# Patient Record
Sex: MALE | Race: BLACK OR AFRICAN AMERICAN | Employment: OTHER | URBAN - METROPOLITAN AREA
[De-identification: names, ages, dates, MRNs, and addresses within clinical notes are randomized per-mention and may not be internally consistent; named-entity substitution may affect disease eponyms.]

---

## 2022-01-01 ENCOUNTER — HOSPITAL ENCOUNTER (EMERGENCY)
Age: 57
End: 2022-01-10
Attending: EMERGENCY MEDICINE
Payer: OTHER GOVERNMENT

## 2022-01-01 VITALS — OXYGEN SATURATION: 77 % | RESPIRATION RATE: 46 BRPM

## 2022-01-01 DIAGNOSIS — Z99.2 ESRD (END STAGE RENAL DISEASE) ON DIALYSIS (HCC): ICD-10-CM

## 2022-01-01 DIAGNOSIS — I46.9 PEA (PULSELESS ELECTRICAL ACTIVITY) (HCC): ICD-10-CM

## 2022-01-01 DIAGNOSIS — I46.9 CARDIAC ARREST (HCC): Primary | ICD-10-CM

## 2022-01-01 DIAGNOSIS — N18.6 ESRD (END STAGE RENAL DISEASE) ON DIALYSIS (HCC): ICD-10-CM

## 2022-01-01 PROCEDURE — 31500 INSERT EMERGENCY AIRWAY: CPT

## 2022-01-01 PROCEDURE — 92950 HEART/LUNG RESUSCITATION CPR: CPT

## 2022-01-01 PROCEDURE — 99283 EMERGENCY DEPT VISIT LOW MDM: CPT

## 2022-01-01 RX ORDER — NOREPINEPHRINE BIT/0.9 % NACL 16MG/250ML
INFUSION BOTTLE (ML) INTRAVENOUS
Status: DISCONTINUED
Start: 2022-01-01 | End: 2022-01-01 | Stop reason: HOSPADM

## 2022-01-10 NOTE — ED PROVIDER NOTES
Emergency Department Encounter    Patient: Rojas Armijo  MRN: [id number]  : 1965  Date of Evaluation: 1/10/2022  ED Provider:  Zeyad Jensen MD      Triage Chief Complaint:   Code      [place]:  Rojas Armijo is a 61 y.o. male that presents end-stage renal disease on hemodialysis, who EMS was detached for respiratory distress, the patient apparently vagal down was in severe respiratory distress, followed by cardiac arrest.  CPR was in progress. History is obtained from EMS. ROS:  Unable to fully obtain given clinical condition    Past Medical History:   Diagnosis Date    Diabetes mellitus ([facility] [address]     ESRD (end stage renal disease) ([facility] [address]     Hypertension     Sleep apnea      No past surgical history on file. No family history on file. Social History     Socioeconomic History    Marital status: Single     Spouse name: Not on file    Number of children: Not on file    Years of education: Not on file    Highest education level: Not on file   Occupational History    Not on file   Tobacco Use    Smoking status: Not on file    Smokeless tobacco: Not on file   Substance and Sexual Activity    Alcohol use: Not on file    Drug use: Not on file    Sexual activity: Not on file   Other Topics Concern    Not on file   Social History Narrative    Not on file     Social Determinants of Health     Financial Resource Strain:     Difficulty of Paying Living Expenses: Not on file   Food Insecurity:     Worried About Running Out of Food in the Last Year: Not on file    Caterina of Food in the Last Year: Not on file   Transportation Needs:     Lack of Transportation (Medical): Not on file    Lack of Transportation (Non-Medical):  Not on file   Physical Activity:     Days of Exercise per Week: Not on file    Minutes of Exercise per Session: Not on file   Stress:     Feeling of Stress : Not on file   Social Connections:     Frequency of Communication with Friends and Family: Not on file    Frequency of Social Gatherings with Friends and Family: Not on file    Attends [employer] Services: Not on file    Active Member of Clubs or Organizations: Not on file    Attends Club or Organization Meetings: Not on file    Marital Status: Not on file   Intimate Partner Violence:     Fear of Current or Ex-Partner: Not on file    Emotionally Abused: Not on file    Physically Abused: Not on file    Sexually Abused: Not on file   Housing Stability:     Unable to Pay for Housing in the Last Year: Not on file    Number of [place] in the Last Year: Not on file    Unstable Housing in the Last Year: Not on file     Current Facility-Administered Medications   Medication Dose Route Frequency Provider Last Rate Last Admin    norepinephrine-sodium chloride (LEVOPHED) 16-0.9 MG/250ML-% infusion              No current outpatient medications on file. Not on File    Nursing Notes Reviewed    Physical Exam:    General appearance:  unresponsive  Skin: Cool extremities. Dry. No signs of trauma  Eye: Pupils are fixed and dilated  Ears, nose, mouth and throat:  Oral mucosa without foreign body  Neck:  Trachea midline. Extremity:  no trauma, cool extremities  Heart: no peripheral pulse, or cardiac activity  Perfusion:  cool extremities, delayed cap refill  Respiratory:  no spontaneous respirations, equal breath sounds with bagging via ET tube  Abdominal:  Non distended. no signs of trauma  Neurologic:  Unresponsive without response to pain in extremities    I have reviewed and interpreted all of the currently available lab results from this visit (if applicable):  No results found for this visit on 01/10/22. Radiographs (if obtained):    [] Radiologist's Report Reviewed:  No results found. Intubation Procedure Note    Indication: Respiratory failure    Consent: Unable to be obtained due to the emergent nature of this procedure. Medications Used: None    Procedure:  The patient was placed in the appropriate position. Cricoid pressure was utilized. Intubation was performed by direct laryngoscopy using a laryngoscope and a 7.5 cuffed endotracheal tube. The cuff was then inflated and the tube was secured appropriately at a distance of 25 cm to the dental ridge. Initial confirmation of placement included bilateral breath sounds, an end tidal CO2 detector, absence of sounds over the stomach, tube fogging and adequate chest rise. A chest x-ray to verify correct placement of the tube has been ordered but is still pending. The patient tolerated the procedure with difficulty. Complications: multiple attempts              EKG (if obtained): (All EKG's are interpreted by myself in the absence of a cardiologist)    MDM:  Patient presents in full cardiac arrest of uncertain etiology. Prehospital interventions initiated per ACLS protocol. On Arrival referral IV access initiated, IV fluids initiated, ACLS protocols begun. Patient's airway was secured endotracheal tube. Appropriate ACLS performed throughout patient's emergency department resuscitation including appropriate rate and depth of CPR, appropriate medications, CPR was maintained throughout the entire course along with appropriately timed pulse checks. Epinephrine, multiple doses of bicarb D50 and insulin were used for suspected hyperkalemia. Epinephrine and Levophed were used all without sustained return of spontaneous circulation. Patient had a brief period of time of transient pulse return, but remained in PEA despite aggressive resuscitation efforts. Shamar airway was replaced by endotracheal tube which was a difficult airway given his body habitus and oral pharyngeal edema. Successful endotracheal intubation was performed with a bougie and through glide scope. Patient remained in asystole despite proceeding efforts.   Family was notified of termination of efforts    Following resuscitation in the emergency department, multiple repeat bedside cardiac ultrasounds all of which revealed absence of cardiac activity, the patient was declared dead at 1224    Total critical care time provided today was 31 minutes. This excludes seperately billable procedures and family discussion time. Critical care time provided for obtaining history, conducting a physical exam, performing and monitoring interventions, ordering, collecting and interpreting tests, and establishing medical decision-making. There was a potential for life/limb threatening pathology requiring close evaluation and intervention with concern for patient decompensation. Clinical Impression:  1. Cardiac arrest ([facility] [address]    2. PEA (Pulseless electrical activity) ([facility] [address]    3. ESRD (end stage renal disease) on dialysis [facility])      Disposition referral (if applicable):  No follow-up provider specified. Disposition medications (if applicable):  New Prescriptions    No medications on file       Comment: Please note this report has been produced using speech recognition software and may contain errors related to that system including errors in grammar, punctuation, and spelling, as well as words and phrases that may be inappropriate. Efforts were made to edit the dictations.       Nishi Burgess MD  24/86/89 8882

## 2022-01-10 NOTE — ED NOTES
Bed: 01  Expected date:   Expected time:   Means of arrival:   Comments:  BRYCE Rehman RN  01/10/22 [id number]

## 2022-01-10 NOTE — ED NOTES
Patient brought in by [facility] @ [phone] from dialysis center. Call was for SOB. Within 3-5 minutes per squad, respiratory status started to decline. Patient was given 2 epi, sodium bicarb and atropine prior to arrival. Trellis Formica airway and 20g in place. CPR remained in progress upon arrival to ER.  1206 calcium  1206 bicarb  1207 PEA - CPR continued  1208 epi  1211 PEA - CPR continued  1211 10 units insulin  1213 amp dextrose  1214 PEA - CPR continued  1214 Levo started @ 60  1217 Levo wide open per Dr. Dante Scanlon  [id number] R hand   3713 epi  1220 intubated 25 @ lip, 7.5 tube, + color change  1221 asystole - CPR continued  1222 epi  7271 PEA - CPR continued    TIME OF DEATH [address] RELEASED. DR. Tanisha Bansal SPOKE TO Namrata YU @ [phone]. BROTHER ON THE WAY.       Mary Villalpando RN  01/10/22 3202